# Patient Record
Sex: FEMALE | NOT HISPANIC OR LATINO | URBAN - NONMETROPOLITAN AREA
[De-identification: names, ages, dates, MRNs, and addresses within clinical notes are randomized per-mention and may not be internally consistent; named-entity substitution may affect disease eponyms.]

---

## 2018-12-19 ENCOUNTER — OFFICE VISIT (OUTPATIENT)
Dept: URBAN - NONMETROPOLITAN AREA CLINIC 18 | Facility: CLINIC | Age: 28
End: 2018-12-19
Payer: COMMERCIAL

## 2018-12-19 DIAGNOSIS — H31.092 OTHER CHORIORETINAL SCARS, LEFT EYE: ICD-10-CM

## 2018-12-19 DIAGNOSIS — Q12.0 CONGENITAL CATARACT: ICD-10-CM

## 2018-12-19 DIAGNOSIS — H52.11 MYOPIA, RIGHT EYE: Primary | ICD-10-CM

## 2018-12-19 DIAGNOSIS — H11.112 CONJUNCTIVAL DEPOSITS, LEFT EYE: ICD-10-CM

## 2018-12-19 PROCEDURE — 92015 DETERMINE REFRACTIVE STATE: CPT | Performed by: OPTOMETRIST

## 2018-12-19 PROCEDURE — 92004 COMPRE OPH EXAM NEW PT 1/>: CPT | Performed by: OPTOMETRIST

## 2018-12-19 ASSESSMENT — INTRAOCULAR PRESSURE
OS: 17
OD: 17

## 2018-12-19 ASSESSMENT — VISUAL ACUITY
OD: 20/20
OS: 20/20

## 2018-12-19 NOTE — IMPRESSION/PLAN
Impression: Myopia, right eye: H52.11. Plan: Reviewed refractive prescription in detail with patient and limited need for glasses to improve vision at this time. Rx released at patient request.  Reviewed risk associated with not dilating pupils. Patient understands risk and declines diagnostic agents.  Patient knows to RTC immediately if flashes, floaters or changes in vision are experienced

## 2018-12-19 NOTE — IMPRESSION/PLAN
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 Plan: Pt reports longstanding adipose tissue since childhood. Surgical referral offered to patient for removal. Pt will monitor for changes and RTC if surgical consult desired or changes observed.

## 2018-12-19 NOTE — IMPRESSION/PLAN
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 Plan: Longstanding. Reviewed status of cataract with patient and potential effect on visual potential. No treatment at this time. Patient will monitor for any decrease in vision.

## 2018-12-19 NOTE — IMPRESSION/PLAN
Impression: Other chorioretinal scars, left eye: H31.092. Plan: All signs/symptoms and risks of retinal detachment and tears were discussed in detail. Patient instructed to call office immediately if any symptoms noted.